# Patient Record
Sex: MALE | Race: WHITE | Employment: UNEMPLOYED | URBAN - METROPOLITAN AREA
[De-identification: names, ages, dates, MRNs, and addresses within clinical notes are randomized per-mention and may not be internally consistent; named-entity substitution may affect disease eponyms.]

---
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
## 2023-08-19 ENCOUNTER — OFFICE VISIT (OUTPATIENT)
Dept: FAMILY MEDICINE CLINIC | Facility: CLINIC | Age: 18
End: 2023-08-19

## 2023-08-19 VITALS
SYSTOLIC BLOOD PRESSURE: 128 MMHG | HEIGHT: 69 IN | RESPIRATION RATE: 18 BRPM | HEART RATE: 58 BPM | OXYGEN SATURATION: 99 % | BODY MASS INDEX: 24.91 KG/M2 | DIASTOLIC BLOOD PRESSURE: 62 MMHG | TEMPERATURE: 97 F | WEIGHT: 168.19 LBS

## 2023-08-19 DIAGNOSIS — Z02.5 SPORTS PHYSICAL: Primary | ICD-10-CM

## 2025-01-02 NOTE — PROGRESS NOTES
Marilyn Son is a 11year old male who was brought in for this visit. History was provided by the CAREGIVER. HPI:   Patient presents with:   Well Child  Some classic migraine symptoms - every few months; aura - visual symptoms, then headache, emesis; las Patient accepted appointment 1/13/25 at 11:30am with Dr Kruse.   intact; mucous membranes are moist  Neck/Thyroid: Neck is supple without adenopathy; no thyromegaly  Respiratory: Chest is normal to inspection; normal respiratory effort; lungs are clear to auscultation bilaterally   Cardiovascular: Rate and rhythm are re

## (undated) NOTE — LETTER
12/4/2019              Declan Liao         Immunization History   Administered Date(s) Administered   • >=3 YRS FLUZONE OR FLUARIX QUAD PRESERVE FREE SINGLE DOSE (71245) FLU CLIN[facility] 12/19/201

## (undated) NOTE — LETTER
11/13/2018              Declan Mcwilliams        [address] [facility] [address]       SCHOOL MEDICATION PERMISSION FORM    SCHOOL DISTRICT:  [address]    TO BE COMPLETED IN DETAIL BY THE PARENT/GUARDIAN:    STUDENT'S NAME:  Kaylene Pete

## (undated) NOTE — LETTER
[facility] [employer] of Child Health Examination       Student's Name  Osman Galdamez Birth [place] Health care provider (MD, , APN, PA , school health professional) verifying above immunization history must sign below.   Signature Certificates of [employer] Exemption to Immunizations or Physician Medical Statements of Medical Contraindication are Reviewed and Maintained by the [employer].            Student's Name  Bart Loredo Birth Date  2/19/2005  Sex  Male School   Grade L Bone/Joint problem/injury/scoliosis?    Yes   No  Parent/Guardian Signature                                          Date     PHYSICAL EXAMINATION REQUIREMENTS    Entire section below to be completed by MD/DO/APN/PA       PHYSICAL EXAMINATION REQUIREMENTS ( Eyes Yes     Screen result:   Genito-Urinary Yes  LMP   Nose Yes  Neurological Yes    Throat Yes  Musculoskeletal Yes    Mouth/Dental Yes  Spinal examination Yes    Cardiovascular/HTN Yes  Nutritional status Yes    Respiratory Yes                   Diagnos Rev 11/15                                                                    Printed by the [employer]

## (undated) NOTE — LETTER
VACCINE ADMINISTRATION RECORD  PARENT / GUARDIAN APPROVAL  Date: 2019  Vaccine administered to:  Betzy Cavazos     : 2005    MRN: [id number]    A copy of the appropriate Centers for Disease Control and Prevention Vaccine Information statement h

## (undated) NOTE — LETTER
VACCINE ADMINISTRATION RECORD  PARENT / GUARDIAN APPROVAL  Date: 11/3/2017  Vaccine administered to:  Jac Larios     : 2005    MRN: [id number]    A copy of the appropriate Centers for Disease Control and Prevention Vaccine Information statement h

## (undated) NOTE — LETTER
[facility] [employer] Child Health Examination       Student's Name  Kaylyn Vail Birth [place] Signature         Signature                                                                                                                                              Title                           Date    (If adding dates to the above immunization hist ALLERGIES  (Food, drug, insect, other)  Patient has no known allergies. MEDICATION  (List all prescribed or taken on a regular basis.)  No current outpatient medications on file. Diagnosis of asthma?   Child wakes during the night coughing   Yes   No    Y adult)   Pulse 67   Ht 5' 8\" (1.727 m)   Wt 65.3 kg (144 lb)   BMI 21.90 kg/m²     DIABETES SCREENING  BMI>85% age/sex  No And any two of the following:  Family History No    Ethnic Minority  No          Signs of Insulin Resistance (hypertension, dyslipid Currently Prescribed Asthma Medication:            Quick-relief  medication (e.g. Short Acting Beta Antagonist): No          Controller medication (e.g. inhaled corticosteroid):   No Other   NEEDS/MODIFICATIONS required in the school setting  None DIET

## (undated) NOTE — LETTER
Name:  Maxine Bland School Year:  10th Grade Class: Student ID No.:   Address:  [address] [place] [address] Phone:  [phone] (home) [phone] (work) :  11year old   Name Relationship Lgl CtraTabitha Leblanc 3 Work [employer] implanted defibrillator? 12. Has anyone in your family had unexplained fainting, seizures, or near drowning?      BONE AND JOINT QUESTIONS Yes No   17. Have you ever had an injury to a bone, muscle, ligament, or tendon that caused you to miss a practice 39.Have you ever been unable to move your arms / legs after being hit /fall? 40. Have you ever become ill while exercising in the heat?     41. Do you get frequent muscle cramps when exercising? 42.  Do you or someone in your family have sickle cell · Location of point of maximal impulse (PMI) Yes    Pulses Yes    Lungs Yes    Abdomen Yes    Genitourinary (males only)* Yes    Skin:  HSV, lesions suggestive of MRSA, tinea corporis Yes    Neurologic* Yes    MUSCULOSKELETAL     Neck Yes    Back Yes    Sh may be asked to submit to testing for the presence of performance-enhancing substances in my/his/her body either during [employer] state series events or during the school day, and I/our student do/does hereby agree to submit to such testing and analysis by a ce